# Patient Record
Sex: MALE | Race: OTHER | NOT HISPANIC OR LATINO | URBAN - METROPOLITAN AREA
[De-identification: names, ages, dates, MRNs, and addresses within clinical notes are randomized per-mention and may not be internally consistent; named-entity substitution may affect disease eponyms.]

---

## 2017-01-07 ENCOUNTER — EMERGENCY (EMERGENCY)
Facility: HOSPITAL | Age: 47
LOS: 1 days | Discharge: HOME | End: 2017-01-07
Attending: EMERGENCY MEDICINE
Payer: COMMERCIAL

## 2017-01-07 VITALS
SYSTOLIC BLOOD PRESSURE: 127 MMHG | TEMPERATURE: 97 F | HEART RATE: 86 BPM | DIASTOLIC BLOOD PRESSURE: 85 MMHG | RESPIRATION RATE: 18 BRPM | OXYGEN SATURATION: 100 %

## 2017-01-07 VITALS
OXYGEN SATURATION: 100 % | WEIGHT: 194.01 LBS | DIASTOLIC BLOOD PRESSURE: 93 MMHG | TEMPERATURE: 97 F | HEIGHT: 71 IN | HEART RATE: 102 BPM | RESPIRATION RATE: 18 BRPM | SYSTOLIC BLOOD PRESSURE: 181 MMHG

## 2017-01-07 DIAGNOSIS — F41.0 PANIC DISORDER [EPISODIC PAROXYSMAL ANXIETY]: ICD-10-CM

## 2017-01-07 DIAGNOSIS — I10 ESSENTIAL (PRIMARY) HYPERTENSION: ICD-10-CM

## 2017-01-07 DIAGNOSIS — F41.9 ANXIETY DISORDER, UNSPECIFIED: ICD-10-CM

## 2017-01-07 PROCEDURE — 99284 EMERGENCY DEPT VISIT MOD MDM: CPT | Mod: 25

## 2017-01-07 RX ORDER — HYDROCHLOROTHIAZIDE 25 MG
0 TABLET ORAL
Qty: 0 | Refills: 0 | COMMUNITY

## 2017-01-07 RX ORDER — METOPROLOL TARTRATE 50 MG
0 TABLET ORAL
Qty: 0 | Refills: 0 | COMMUNITY

## 2017-01-07 RX ORDER — ALPRAZOLAM 0.25 MG
1 TABLET ORAL
Qty: 9 | Refills: 0 | OUTPATIENT
Start: 2017-01-07 | End: 2017-01-10

## 2017-01-07 RX ORDER — ALPRAZOLAM 0.25 MG
0.5 TABLET ORAL ONCE
Qty: 0 | Refills: 0 | Status: DISCONTINUED | OUTPATIENT
Start: 2017-01-07 | End: 2017-01-07

## 2017-01-07 RX ADMIN — Medication 0.5 MILLIGRAM(S): at 03:38

## 2017-01-07 NOTE — ED PROVIDER NOTE - NS ED MD SCRIBE ATTENDING SCRIBE SECTIONS
PAST MEDICAL/SURGICAL/SOCIAL HISTORY/REVIEW OF SYSTEMS/HISTORY OF PRESENT ILLNESS/VITAL SIGNS( Pullset)/DISPOSITION/HIV/PHYSICAL EXAM

## 2017-01-07 NOTE — ED PROVIDER NOTE - MEDICAL DECISION MAKING DETAILS
45 yo M with history of anxiety that presents with anxiety and panic attack. Appears well now, took xanax at home prior to arrival. Will provide meds and reassess.   VS improved to WNL s/p xanax here. Will send home with Rx for same and f/u with psych and PMD as already has scheduled appt next week. Return for worsening symptoms. Denies any end organ damage symptoms here in ED including chest pain, SOB, flank pain, headache, vision/hearing changes, dizziness, nausea, vomiting, hematuria.

## 2017-01-07 NOTE — ED ADULT NURSE NOTE - OBJECTIVE STATEMENT
Patient here for high blood pressure today, patient thing Patient here for high blood pressure today, patient feels anxious

## 2017-01-07 NOTE — ED PROVIDER NOTE - PHYSICAL EXAMINATION
GENERAL: No acute distress, non toxic  HEAD: Atraumatic, normocephalic  EARS: Externally normal, atraumatic, TMs normal bilaterally  EYES: No jaundice, not injected, no rupture, no foreign bodies  MOUTH: Moist mucous membranes, no open lesion, uvula midline without edema, no exudates, no peritonsilar abscess bilaterally.  NECK: Supple, full range of motion, no swelling, no lymphadenopathy  HEART: Regular rate and rhythm, no murmurs, no rubs, no gallops  LUNGS: Clear to auscultation bilaterally without rhonci, rales, or wheezing  ABDOMEN: Soft and non tender in all 4 quadrants, normal bowel sounds, no signs of trauma, no costovertebral tenderness bilaterally  BACK/SPINE: Non tender spine in cervical/thoracic/lumbar regions, no stepoffs palpable  EXTREMITIES: No gross deformities  VASCULAR: Pulses palpable in all extremities, no pitting edema, capillary refill <2 secs  SKIN: Grossly intact without rash or open wounds  PSYCH: Alert and oriented x 3  GAIT: Normal without need for assistance

## 2017-01-07 NOTE — ED PROVIDER NOTE - DIAGNOSIS COUNSELING, MDM
conducted a detailed discussion... I had a detailed discussion with the patient and/or guardian regarding the historical points, exam findings, and any diagnostic results supporting the discharge/admit diagnosis. I had a detailed discussion with the patient regarding the historical points, exam findings, and any diagnostic results supporting the discharge diagnosis.

## 2017-01-07 NOTE — ED PROVIDER NOTE - DETAILS:
I personally performed the services describes in the documentation, reviewed the documentation recorded by the scribe in my presence and it accurately and completely records my words and actions

## 2017-01-07 NOTE — ED PROVIDER NOTE - OBJECTIVE STATEMENT
47 y/o male with PMHx of HTN presents to the ED c/o anxiety onset today. Pt reports recent increased work stress leading to panic attack today, by feeling increased HR and palpitations. Pt also notes increased BP today as well. Pt took Xanax 0.5 mg (Rx by psych) to mild relief in Sx. Pt reports drinks 2-3 cups of coffee per day normally. Pt denies fever, chills, CP, palpitations, numbness, tingling, n/v/d, sweating, or any other complaints. NKDA. 47 y/o male with PMHx of HTN presents to the ED c/o anxiety onset today. Pt reports recent increased work stress leading to panic attack today, by feeling increased HR and palpitations. Pt also notes increased BP today as well. Pt took Xanax 0.5 mg (Rx by psych) to mild relief in Sx. Pt reports drinks 2-3 cups of coffee per day normally. Pt denies fever, chills, CP, palpitations, numbness, tingling, n/v/d, sweating, or any other complaints. NKDA. Denies any end organ damage symptoms here in ED including chest pain, SOB, flank pain, headache, vision/hearing changes, dizziness, nausea, vomiting, hematuria.

## 2018-07-10 NOTE — ED PROVIDER NOTE - PRINCIPAL DIAGNOSIS
Ears: no ear pain and no hearing problems.Nose: no nasal congestion and no nasal drainage.Mouth/Throat: no dysphagia, no hoarseness and no throat pain.Neck: no lumps, no pain, no stiffness and no swollen glands. Panic attack

## 2021-12-09 NOTE — ED ADULT NURSE NOTE - FALLEN IN THE PAST
I called Dr. Janna Ferrer office and spoke to Stanton to request cardiac clearance and if ok to hold Coumadin 4 days pre-op. She will send him the message. She states he will be back in office on Monday 12/13. no